# Patient Record
Sex: MALE | Race: WHITE | NOT HISPANIC OR LATINO | URBAN - METROPOLITAN AREA
[De-identification: names, ages, dates, MRNs, and addresses within clinical notes are randomized per-mention and may not be internally consistent; named-entity substitution may affect disease eponyms.]

---

## 2024-09-28 ENCOUNTER — OFFICE VISIT (OUTPATIENT)
Dept: URGENT CARE | Age: 39
End: 2024-09-28
Payer: COMMERCIAL

## 2024-09-28 VITALS
DIASTOLIC BLOOD PRESSURE: 90 MMHG | OXYGEN SATURATION: 98 % | SYSTOLIC BLOOD PRESSURE: 137 MMHG | RESPIRATION RATE: 16 BRPM | HEART RATE: 63 BPM

## 2024-09-28 DIAGNOSIS — S61.215A LACERATION OF LEFT RING FINGER WITHOUT FOREIGN BODY WITHOUT DAMAGE TO NAIL, INITIAL ENCOUNTER: Primary | ICD-10-CM

## 2024-09-28 RX ORDER — METHIMAZOLE 5 MG/1
5 TABLET ORAL 2 TIMES WEEKLY
COMMUNITY
Start: 2024-07-26

## 2024-09-28 RX ORDER — ESCITALOPRAM OXALATE 10 MG/1
10 TABLET ORAL
COMMUNITY
Start: 2024-04-30

## 2024-09-28 RX ORDER — FENOFIBRATE 160 MG/1
160 TABLET ORAL
COMMUNITY
Start: 2024-04-15

## 2024-09-28 ASSESSMENT — ENCOUNTER SYMPTOMS
CARDIOVASCULAR NEGATIVE: 1
ALLERGIC/IMMUNOLOGIC NEGATIVE: 1
CONSTITUTIONAL NEGATIVE: 1
PSYCHIATRIC NEGATIVE: 1
WOUND: 1
NEUROLOGICAL NEGATIVE: 1
MUSCULOSKELETAL NEGATIVE: 1

## 2024-09-28 NOTE — PROGRESS NOTES
Subjective   Patient ID: Tk Remy is a 39 y.o. male. They present today with a chief complaint of Laceration (L 4th finger today @ work).    History of Present Illness  38 yo male presents with laceration that he sustained with razor at work today.  Works at [employer], was cutting plastic straps with razor.  Does not want to bill under [facility].  Declining to start [facility] claim.  Verbalizing understanding that he may not be able to change at later time if he changes his mind.  Up to date on tetanus.      Laceration      Past Medical History  Allergies as of 09/28/2024    (No Known Allergies)       (Not in a hospital admission)       No past medical history on file.    No past surgical history on file.         Review of Systems  Review of Systems   Constitutional: Negative.    Cardiovascular: Negative.    Musculoskeletal: Negative.    Skin:  Positive for wound.   Allergic/Immunologic: Negative.    Neurological: Negative.    Psychiatric/Behavioral: Negative.                                    Objective    Vitals:    09/28/24 1233   BP: 137/90   Pulse: 63   Resp: 16   SpO2: 98%     No LMP for male patient.    Physical Exam  Musculoskeletal:      Comments: Left-able to bend, move 4th finger without difficulty   Skin:     Comments: Left 4th finger Dorsal aspect, middle phlanx 1.5cm laceration, no apparent tendon damage         Laceration Repair    Date/Time: 9/28/2024 1:48 PM    Performed by: VEL Gates  Authorized by: VEL Gates    Consent:     Consent obtained:  Verbal and written    Consent given by:  Patient    Risks discussed:  Infection, pain, retained foreign body, need for additional repair, poor cosmetic result, tendon damage, nerve damage, poor wound healing and vascular damage    Alternatives discussed:  Referral  [place] protocol:     Procedure explained and questions answered to patient or proxy's satisfaction: yes      Relevant documents present and verified: yes      Patient  identity confirmed:  Verbally with patient  Anesthesia:     Anesthesia method:  Nerve block    Block needle gauge:  25 G    Block anesthetic:  Lidocaine 1% w/o epi    Block injection procedure:  Anatomic landmarks identified, introduced needle, incremental injection, anatomic landmarks palpated and negative aspiration for blood    Block outcome:  Anesthesia achieved  Laceration details:     Location:  Finger    Finger location:  L ring finger    Length (cm):  1.5  Pre-procedure details:     Preparation:  Patient was prepped and draped in usual sterile fashion  Exploration:     Limited defect created (wound extended): no      Hemostasis achieved with:  Epinephrine and direct pressure    Imaging outcome: foreign body not noted      Wound exploration: wound explored through full range of motion      Wound extent: no foreign bodies/material noted, no muscle damage noted, no nerve damage noted, no tendon damage noted and no underlying fracture noted      Contaminated: no    Treatment:     Area cleansed with:  Chlorhexidine    Amount of cleaning:  Standard    Irrigation solution:  Tap water    Visualized foreign bodies/material removed: no      Debridement:  None  Skin repair:     Repair method:  Sutures    Suture size:  5-0    Suture material:  Nylon    Suture technique:  Simple interrupted    Number of sutures:  4  Approximation:     Approximation:  Close  Repair type:     Repair type:  Simple  Post-procedure details:     Dressing:  Splint for protection and tube gauze    Procedure completion:  Tolerated well, no immediate complications      Point of Care Test & Imaging Results from this visit  No results found for this visit on 09/28/24.   No results found.    Diagnostic study results (if any) were reviewed by VEL Gates.    Assessment/Plan   Allergies, medications, history, and pertinent labs/EKGs/Imaging reviewed by VEL Gates.     Medical Decision Making  Sutures can be removed in 7-10  days. Keep wound clean with soap and water and apply antibiotic ointment and bandage daily.   Avoid soaking in tub bath, swimming pool, jacuzzi  Follow up with any signs of infection: Redness, drainage, swelling, pain, fever, chills                                   Diagnoses and all orders for this visit:  Laceration of left ring finger without foreign body without damage to nail, initial encounter      Medical Admin Record      Patient disposition: Home    Electronically signed by VEL Gates  1:42 PM

## 2024-10-06 ENCOUNTER — OFFICE VISIT (OUTPATIENT)
Dept: URGENT CARE | Age: 39
End: 2024-10-06
Payer: COMMERCIAL

## 2024-10-06 VITALS
OXYGEN SATURATION: 97 % | TEMPERATURE: 97.5 F | DIASTOLIC BLOOD PRESSURE: 99 MMHG | HEART RATE: 76 BPM | SYSTOLIC BLOOD PRESSURE: 149 MMHG

## 2024-10-06 DIAGNOSIS — S61.215D LACERATION OF LEFT RING FINGER WITHOUT FOREIGN BODY WITHOUT DAMAGE TO NAIL, SUBSEQUENT ENCOUNTER: Primary | ICD-10-CM

## 2024-10-06 PROCEDURE — PRO95 ED SUTURE REMOVAL: Performed by: NURSE PRACTITIONER

## 2024-10-06 PROCEDURE — 99499 UNLISTED E&M SERVICE: CPT | Performed by: NURSE PRACTITIONER

## 2024-10-07 NOTE — PROGRESS NOTES
40 yo presents for suture removal.  4 Sutures were placed 9/28/24 left ring finger.  1 Suture fell out, 3 remain, no s/ of infection.  Remaining 3 sutures removed and 1 steri strip applied